# Patient Record
Sex: FEMALE | Race: WHITE | Employment: FULL TIME | URBAN - METROPOLITAN AREA
[De-identification: names, ages, dates, MRNs, and addresses within clinical notes are randomized per-mention and may not be internally consistent; named-entity substitution may affect disease eponyms.]

---

## 2017-04-10 ENCOUNTER — OFFICE VISIT (OUTPATIENT)
Dept: INTERNAL MEDICINE CLINIC | Facility: CLINIC | Age: 22
End: 2017-04-10

## 2017-04-10 VITALS
HEART RATE: 112 BPM | DIASTOLIC BLOOD PRESSURE: 80 MMHG | OXYGEN SATURATION: 97 % | SYSTOLIC BLOOD PRESSURE: 104 MMHG | BODY MASS INDEX: 43.15 KG/M2 | HEIGHT: 65 IN | TEMPERATURE: 99.1 F | RESPIRATION RATE: 18 BRPM | WEIGHT: 259 LBS

## 2017-04-10 DIAGNOSIS — E28.2 PCOS (POLYCYSTIC OVARIAN SYNDROME): Primary | ICD-10-CM

## 2017-04-10 PROBLEM — G93.2 PSEUDOTUMOR CEREBRI: Status: ACTIVE | Noted: 2017-04-10

## 2017-04-10 PROBLEM — E66.01 MORBID OBESITY (HCC): Status: ACTIVE | Noted: 2017-04-10

## 2017-04-10 PROBLEM — R01.1 HEART MURMUR: Status: ACTIVE | Noted: 2017-04-10

## 2017-04-10 LAB — HBA1C MFR BLD HPLC: 5.2 %

## 2017-04-10 RX ORDER — CLONAZEPAM 0.5 MG/1
TABLET ORAL
COMMUNITY
End: 2022-05-09

## 2017-04-10 RX ORDER — LORATADINE 10 MG/1
10 TABLET ORAL
COMMUNITY
End: 2022-05-09

## 2017-04-10 RX ORDER — ETONOGESTREL AND ETHINYL ESTRADIOL 11.7; 2.7 MG/1; MG/1
INSERT, EXTENDED RELEASE VAGINAL
COMMUNITY
End: 2022-05-09

## 2017-04-10 RX ORDER — METFORMIN HYDROCHLORIDE 500 MG/1
500 TABLET ORAL 2 TIMES DAILY WITH MEALS
Qty: 60 TAB | Refills: 5 | Status: SHIPPED | OUTPATIENT
Start: 2017-04-10 | End: 2022-05-09

## 2017-04-10 RX ORDER — ESCITALOPRAM OXALATE 10 MG/1
10 TABLET ORAL DAILY
COMMUNITY
End: 2022-05-09

## 2017-04-10 NOTE — PROGRESS NOTES
HISTORY OF PRESENT ILLNESS  Chad Wayne is a 23 y.o. female. Referred by Dr. Oamira Mcgill. Dx with Pseudotumor Cerebri. Was told to lose weight and follow up. Trouble losing weight. HPI  1) PCOS - dx 8/2016 after skipping periods x months. Pt was put on birth control (Cathlean Oar). As pt is not interested in maintaining her fertility and her acne is improved on birth control, she is happy with this. BS, Thyroid, and Testosterone were normal at GYN. Didn't have A1c done at GYN. Pt has done lots of reading and would like to try Metformin in addition to Cathlean Oar in the hopes that it might help her lose weight. Pt eats a plant based diet, avoiding most meat. Eats eggs regularly, but has not recently been focusing on protein. Plans to start doing so. Review of Systems   Constitutional: Negative for malaise/fatigue and weight loss. HENT: Positive for congestion. Endo/Heme/Allergies: Positive for environmental allergies. Psychiatric/Behavioral: Negative for depression. The patient is not nervous/anxious. Physical Exam   Constitutional: She is oriented to person, place, and time. She appears well-developed and well-nourished. No distress. HENT:   Head: Normocephalic and atraumatic. Neck: Neck supple. No JVD present. No thyromegaly present. Cardiovascular: Normal rate and regular rhythm. Murmur heard. Very soft heart murmur noted. Pulmonary/Chest: Effort normal and breath sounds normal. No respiratory distress. Musculoskeletal: She exhibits no edema. Neurological: She is alert and oriented to person, place, and time. Skin: Skin is warm and dry. Psychiatric: She has a normal mood and affect. Her behavior is normal. Judgment and thought content normal.   Nursing note and vitals reviewed. ASSESSMENT and PLAN    ICD-10-CM ICD-9-CM    1.  PCOS (polycystic ovarian syndrome) E28.2 256.4 AMB POC HEMOGLOBIN A1C normal.   Emphasized eating protein with each meal.       Start metFORMIN (GLUCOPHAGE) 500 mg tablet

## 2017-04-10 NOTE — MR AVS SNAPSHOT
Visit Information Date & Time Provider Department Dept. Phone Encounter #  
 4/10/2017  2:15 PM Jed Avila PA-C [facility] Internal Medicine [phone] [id number] Follow-up Instructions Return in about 6 months (around 10/10/2017) for Physical when convenient. Upcoming Health Maintenance Date Due Hepatitis A Peds Age 1-18 (1 of 2 - Standard Series) 3/25/1998 DTaP/Tdap/Td series (1 - Tdap) 3/25/2004 HPV AGE 9Y-26Y (1 of 3 - Female 3 Dose Series) 3/25/2008 INFLUENZA AGE 9 TO ADULT 8/1/2016 Allergies as of 4/10/2017  Review Complete On: 4/10/2017 By: Jed Avila PA-C No Known Allergies Current Immunizations  Never Reviewed No immunizations on file. Not reviewed this visit You Were Diagnosed With   
  
 Codes Comments PCOS (polycystic ovarian syndrome)    -  Primary ICD-10-CM: E28.2 ICD-9-CM: 256.4 Vitals BP Pulse Temp Resp Height(growth percentile) Weight(growth percentile) 147/87 (BP 1 Location: Left arm, BP Patient Position: Sitting) (!) 112 99.1 °F (37.3 °C) (Oral) 18 5' 5\" (1.651 m) 259 lb (117.5 kg) LMP SpO2 BMI OB Status Smoking Status 03/23/2017 97% 43.1 kg/m2 Having regular periods Former Smoker Vitals History BMI and BSA Data Body Mass Index Body Surface Area  
 43.1 kg/m 2 2.32 m 2 Preferred Pharmacy Pharmacy Name Phone [facility] [id number] [facility] [place] [phone] Your Updated Medication List  
  
   
This list is accurate as of: 4/10/17  3:14 PM.  Always use your most recent med list.  
  
  
  
  
 CLARITIN 10 mg tablet Generic drug:  loratadine Take 10 mg by mouth. KlonoPIN 0.5 mg tablet Generic drug:  clonazePAM  
Take  by mouth nightly as needed. LEXAPRO 10 mg tablet Generic drug:  escitalopram oxalate Take 10 mg by mouth daily. metFORMIN 500 mg tablet Commonly known as:  GLUCOPHAGE Take 1 Tab by mouth two (2) times daily (with meals). NUVARING 0.12-0.015 mg/24 hr vaginal ring Generic drug:  ethinyl estradiol-etonogestrel  
by Intravaginal route. Prescriptions Sent to Pharmacy Refills  
 metFORMIN (GLUCOPHAGE) 500 mg tablet 5 Sig: Take 1 Tab by mouth two (2) times daily (with meals). Class: Normal  
 Pharmacy: [facility] [id number] [facility] #: [phone] Route: Oral  
  
We Performed the Following AMB POC HEMOGLOBIN A1C [16604 CPT(R)] Follow-up Instructions Return in about 6 months (around 10/10/2017) for Physical when convenient. Introducing [facility]! [employer] introduces [employer] patient portal. Now you can access parts of your medical record, email your doctor's office, and request medication refills online. 1. In your internet browser, go to [email or web address] 2. Click on the First Time User? Click Here link in the Sign In box. You will see the New Member Sign Up page. 3. Enter your [email or web address] Access Code exactly as it appears below. You will not need to use this code after youve completed the sign-up process. If you do not sign up before the expiration date, you must request a new code. · [employer]ros Access Code: [facility] Expires: 7/9/2017  3:10 PM 
 
4. Enter the last four digits of your Social Security Number (xxxx) and Date of Birth (mm/dd/yyyy) as indicated and click Submit. You will be taken to the next sign-up page. 5. Create a [email or web address] ID. This will be your Tyros login ID and cannot be changed, so think of one that is secure and easy to remember. 6. Create a [employer]ros password. You can change your password at any time. 7. Enter your Password Reset Question and Answer. This can be used at a later time if you forget your password. 8. Enter your e-mail address. You will receive e-mail notification when new information is available in [address]. 9. Click Sign Up. You can now view and download portions of your medical record. 10. Click the Download Summary menu link to download a portable copy of your medical information. If you have questions, please visit the Frequently Asked Questions section of the [employer] website. Remember, [employer] is NOT to be used for urgent needs. For medical emergencies, dial 911. Now available from your iPhone and Android! Please provide this summary of care documentation to your next provider. If you have any questions after today's visit, please call [phone].

## 2017-04-10 NOTE — PROGRESS NOTES
Room 8    Chief Complaint   Patient presents with   [address] [facility] Patient     Establish Care. Patient would like to discuss starting metformin for PCOS. Newly diagnosed in 8/2016. Currently on Birth control     1. Have you been to the ER, urgent care clinic since your last visit? Hospitalized since your last visit? Yes Reason for visit: To [facility] for glass in foot a couple of weeks ago per patient    2. Have you seen or consulted any other health care providers outside of the [address] [place] [address] since your last visit? Include any pap smears or colon screening.  Yes Where: [facility]     Health Maintenance Due   Topic Date Due    Hepatitis A Peds Age 1-18 (1 of 2 - Standard Series) 03/25/1998    DTaP/Tdap/Td series (1 - Tdap) 03/25/2004    HPV AGE 9Y-26Y (1 of 3 - Female 3 Dose Series) 03/25/2008    INFLUENZA AGE 9 TO ADULT  08/01/2016

## 2017-04-29 ENCOUNTER — HOSPITAL ENCOUNTER (EMERGENCY)
Age: 22
Discharge: HOME | End: 2017-04-29
Attending: STUDENT IN AN ORGANIZED HEALTH CARE EDUCATION/TRAINING PROGRAM
Payer: COMMERCIAL

## 2017-04-29 ENCOUNTER — APPOINTMENT (OUTPATIENT)
Dept: CT IMAGING | Age: 22
End: 2017-04-29
Attending: STUDENT IN AN ORGANIZED HEALTH CARE EDUCATION/TRAINING PROGRAM
Payer: COMMERCIAL

## 2017-04-29 VITALS
WEIGHT: 263.45 LBS | BODY MASS INDEX: 43.84 KG/M2 | RESPIRATION RATE: 22 BRPM | HEART RATE: 115 BPM | SYSTOLIC BLOOD PRESSURE: 144 MMHG | OXYGEN SATURATION: 100 % | TEMPERATURE: 98.1 F | DIASTOLIC BLOOD PRESSURE: 92 MMHG

## 2017-04-29 DIAGNOSIS — G43.019 INTRACTABLE MIGRAINE WITHOUT AURA AND WITHOUT STATUS MIGRAINOSUS: Primary | ICD-10-CM

## 2017-04-29 LAB
BASOPHILS # BLD AUTO: 0 K/UL (ref 0–0.1)
BASOPHILS # BLD: 0 % (ref 0–1)
EOSINOPHIL # BLD: 0.4 K/UL (ref 0–0.4)
EOSINOPHIL NFR BLD: 3 % (ref 0–7)
ERYTHROCYTE [DISTWIDTH] IN BLOOD BY AUTOMATED COUNT: 13.1 % (ref 11.5–14.5)
HCG UR QL: NEGATIVE
HCT VFR BLD AUTO: 40.1 % (ref 35–47)
HGB BLD-MCNC: 13.1 G/DL (ref 11.5–16)
LYMPHOCYTES # BLD AUTO: 37 % (ref 12–49)
LYMPHOCYTES # BLD: 3.9 K/UL (ref 0.8–3.5)
MCH RBC QN AUTO: 26.8 PG (ref 26–34)
MCHC RBC AUTO-ENTMCNC: 32.7 G/DL (ref 30–36.5)
MCV RBC AUTO: 82 FL (ref 80–99)
MONOCYTES # BLD: 0.7 K/UL (ref 0–1)
MONOCYTES NFR BLD AUTO: 7 % (ref 5–13)
NEUTS SEG # BLD: 5.6 K/UL (ref 1.8–8)
NEUTS SEG NFR BLD AUTO: 53 % (ref 32–75)
PLATELET # BLD AUTO: 390 K/UL (ref 150–400)
RBC # BLD AUTO: 4.89 M/UL (ref 3.8–5.2)
WBC # BLD AUTO: 10.6 K/UL (ref 3.6–11)

## 2017-04-29 PROCEDURE — 96361 HYDRATE IV INFUSION ADD-ON: CPT

## 2017-04-29 PROCEDURE — 74011250636 HC RX REV CODE- 250/636: Performed by: STUDENT IN AN ORGANIZED HEALTH CARE EDUCATION/TRAINING PROGRAM

## 2017-04-29 PROCEDURE — 99283 EMERGENCY DEPT VISIT LOW MDM: CPT

## 2017-04-29 PROCEDURE — 81025 URINE PREGNANCY TEST: CPT

## 2017-04-29 PROCEDURE — 96375 TX/PRO/DX INJ NEW DRUG ADDON: CPT

## 2017-04-29 PROCEDURE — 96374 THER/PROPH/DIAG INJ IV PUSH: CPT

## 2017-04-29 PROCEDURE — 70450 CT HEAD/BRAIN W/O DYE: CPT

## 2017-04-29 PROCEDURE — 85025 COMPLETE CBC W/AUTO DIFF WBC: CPT | Performed by: STUDENT IN AN ORGANIZED HEALTH CARE EDUCATION/TRAINING PROGRAM

## 2017-04-29 RX ORDER — KETOROLAC TROMETHAMINE 30 MG/ML
30 INJECTION, SOLUTION INTRAMUSCULAR; INTRAVENOUS
Status: COMPLETED | OUTPATIENT
Start: 2017-04-29 | End: 2017-04-29

## 2017-04-29 RX ORDER — METOCLOPRAMIDE HYDROCHLORIDE 5 MG/ML
10 INJECTION INTRAMUSCULAR; INTRAVENOUS ONCE
Status: COMPLETED | OUTPATIENT
Start: 2017-04-29 | End: 2017-04-29

## 2017-04-29 RX ORDER — DIPHENHYDRAMINE HYDROCHLORIDE 50 MG/ML
25 INJECTION, SOLUTION INTRAMUSCULAR; INTRAVENOUS ONCE
Status: COMPLETED | OUTPATIENT
Start: 2017-04-29 | End: 2017-04-29

## 2017-04-29 RX ADMIN — KETOROLAC TROMETHAMINE 30 MG: 30 INJECTION, SOLUTION INTRAMUSCULAR at 19:07

## 2017-04-29 RX ADMIN — DIPHENHYDRAMINE HYDROCHLORIDE 25 MG: 50 INJECTION, SOLUTION INTRAMUSCULAR; INTRAVENOUS at 19:06

## 2017-04-29 RX ADMIN — SODIUM CHLORIDE 1000 ML: 900 INJECTION, SOLUTION INTRAVENOUS at 19:07

## 2017-04-29 RX ADMIN — METOCLOPRAMIDE 10 MG: 5 INJECTION, SOLUTION INTRAMUSCULAR; INTRAVENOUS at 19:08

## 2017-04-29 NOTE — DISCHARGE INSTRUCTIONS
Migraine Headache: Care Instructions  Your Care Instructions  Migraines are painful, throbbing headaches that often start on one side of the head. They may cause nausea and vomiting and make you sensitive to light, sound, or smell. Without treatment, migraines can last from 4 hours to a few days. Medicines can help prevent migraines or stop them after they have started. Your doctor can help you find which ones work best for you. Follow-up care is a key part of your treatment and safety. Be sure to make and go to all appointments, and call your doctor if you are having problems. It's also a good idea to know your test results and keep a list of the medicines you take. How can you care for yourself at home? · Do not drive if you have taken a prescription pain medicine. · Rest in a quiet, dark room until your headache is gone. Close your eyes, and try to relax or go to sleep. Don't watch TV or read. · Put a cold, moist cloth or cold pack on the painful area for 10 to 20 minutes at a time. Put a thin cloth between the cold pack and your skin. · Use a warm, moist towel or a heating pad set on low to relax tight shoulder and neck muscles. · Have someone gently massage your neck and shoulders. · Take your medicines exactly as prescribed. Call your doctor if you think you are having a problem with your medicine. You will get more details on the specific medicines your doctor prescribes. · Be careful not to take pain medicine more often than the instructions allow. You could get worse or more frequent headaches when the medicine wears off. To prevent migraines  · Keep a headache diary so you can figure out what triggers your headaches. Avoiding triggers may help you prevent headaches. Record when each headache began, how long it lasted, and what the pain was like.  (Was it throbbing, aching, stabbing, or dull?) Write down any other symptoms you had with the headache, such as nausea, flashing lights or dark spots, or sensitivity to bright light or loud noise. Note if the headache occurred near your period. List anything that might have triggered the headache. Triggers may include certain foods (chocolate, cheese, wine) or odors, smoke, bright light, stress, or lack of sleep. · If your doctor has prescribed medicine for your migraines, take it as directed. You may have medicine that you take only when you get a migraine and medicine that you take all the time to help prevent migraines. ¨ If your doctor has prescribed medicine for when you get a headache, take it at the first sign of a migraine, unless your doctor has given you other instructions. ¨ If your doctor has prescribed medicine to prevent migraines, take it exactly as prescribed. Call your doctor if you think you are having a problem with your medicine. · Find healthy ways to deal with stress. Migraines are most common during or right after stressful times. Take time to relax before and after you do something that has caused a migraine in the past.  · Try to keep your muscles relaxed by keeping good posture. Check your jaw, face, neck, and shoulder muscles for tension. Try to relax them. When you sit at a desk, change positions often. And make sure to stretch for 30 seconds each hour. · Get plenty of sleep and exercise. · Eat meals on a regular schedule. Avoid foods and drinks that often trigger migraines. These include chocolate, alcohol (especially red wine and port), aspartame, monosodium glutamate (MSG), and some additives found in foods (such as hot dogs, blake, cold cuts, aged cheeses, and pickled foods). · Limit caffeine. Don't drink too much coffee, tea, or soda. But don't quit caffeine suddenly. That can also give you migraines. · Do not smoke or allow others to smoke around you. If you need help quitting, talk to your doctor about stop-smoking programs and medicines. These can increase your chances of quitting for good.   · If you are taking birth control pills or hormone therapy, talk to your doctor about whether they are triggering your migraines. When should you call for help? Call 911 anytime you think you may need emergency care. For example, call if:  · You have signs of a stroke. These may include:  ¨ Sudden numbness, paralysis, or weakness in your face, arm, or leg, especially on only one side of your body. ¨ Sudden vision changes. ¨ Sudden trouble speaking. ¨ Sudden confusion or trouble understanding simple statements. ¨ Sudden problems with walking or balance. ¨ A sudden, severe headache that is different from past headaches. Call your doctor now or seek immediate medical care if:  · You have new or worse nausea and vomiting. · You have a new or higher fever. · Your headache gets much worse. Watch closely for changes in your health, and be sure to contact your doctor if:  · You are not getting better after 2 days (48 hours). Where can you learn more? Go to http://hemant-camacho.info/. Enter B225 in the search box to learn more about \"Migraine Headache: Care Instructions. \"  Current as of: October 14, 2016  Content Version: 11.2  © 3609-1126 [employer]. Care instructions adapted under license by [employer] (which disclaims liability or warranty for this information). If you have questions about a medical condition or this instruction, always ask your healthcare professional. [address] any warranty or liability for your use of this information.

## 2017-04-29 NOTE — ED NOTES
Rn went in room pt crying and stating she is very anxious and wants to go home. RN explained that the medication can add to this feeling. Tried relaxation techniques and pt refused stating she wants to go home.  MD holt

## 2017-04-29 NOTE — ED NOTES
EDUCATION: Patient education given on tylenol/motrin and the patient expresses understanding and acceptance of instructions.  Neli Alonzo RN 4/29/2017 7:47 PM

## 2017-04-29 NOTE — ED PROVIDER NOTES
HPI Comments: 22 yo F with history of anxiety and PCOS presenting for evaluation of headache. Onset of symptoms was 20 minutes prior to arrival.  Patient reports that this is the headache is bifrontal in natures and is associated with vision changes. Patient reports \"difficulty focusing\" and increased floaters in her eyes. No nausea or vomiting. No neck pain or neck stiffness. No fevers. No difficulty ambulating. No smoking or recent travel. The patient is on the [address]. Denies AM headaches. History of mild headaches. Patient is a 23 y.o. female presenting with headaches. The history is provided by the patient. Headache    Pertinent negatives include no fever, no shortness of breath, no dizziness, no nausea and no vomiting. 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         Family History:   Problem Relation Age of Onset    Other Mother      pcos    Hypertension Father     Diabetes Maternal Grandfather        Social History     Social History    Marital status: SINGLE     Spouse name: N/A    Number of children: 0    Years of education: N/A     Occupational History    Student - psychology and gender studies      [employer]    Part Time Job       Social History Main Topics    Smoking status: Former Smoker     Packs/day: 0.05     Years: 2.00     Types: Cigarettes    Smokeless tobacco: Never Used    Alcohol use Yes      Comment: 2x/month 2-4 drinks    Drug use: Yes     Special: Marijuana      Comment: 1-3x/week     Sexual activity: Not Currently     Other Topics Concern    Not on file     Social History Narrative    Lives with 2 roommates    2 cats         ALLERGIES: Review of patient's allergies indicates no known allergies.     Review of Systems   Constitutional: Negative for activity change, appetite change, chills, fatigue and fever. HENT: Negative for congestion, ear discharge, ear pain, hearing loss and rhinorrhea. Eyes: Negative for photophobia, redness and visual disturbance. Respiratory: Negative for cough, chest tightness, shortness of breath, wheezing and stridor. Cardiovascular: Negative for chest pain. Gastrointestinal: Negative for abdominal pain, constipation, diarrhea, nausea and vomiting. Genitourinary: Negative for decreased urine volume and dysuria. Musculoskeletal: Negative for back pain, neck pain and neck stiffness. Neurological: Positive for headaches. Negative for dizziness, seizures, syncope, light-headedness and numbness. Hematological: Does not bruise/bleed easily. All other systems reviewed and are negative. Vitals:    04/29/17 1707   BP: (!) 144/92   Pulse: (!) 109   Resp: 20   Temp: 98 °F (36.7 °C)   SpO2: 99%   Weight: 119.5 kg (263 lb 7.2 oz)            Physical Exam   Constitutional: She is oriented to person, place, and time. She appears well-developed and well-nourished. No distress. HENT:   Head: Normocephalic and atraumatic. Right Ear: External ear normal. Tympanic membrane is not erythematous. Left Ear: External ear normal. Tympanic membrane is not erythematous. Nose: Nose normal.   Mouth/Throat: Oropharynx is clear and moist. No oropharyngeal exudate. Eyes: Conjunctivae and EOM are normal. Right eye exhibits no discharge. Left eye exhibits no discharge. Fundoscopic exam:       The right eye shows no hemorrhage and no papilledema. The left eye shows no hemorrhage and no papilledema. Neck: Normal range of motion. Neck supple. Cardiovascular: Normal rate, regular rhythm, normal heart sounds and intact distal pulses. Exam reveals no gallop and no friction rub. No murmur heard. Pulmonary/Chest: Effort normal and breath sounds normal. No respiratory distress. She has no wheezes. She has no rales.  She exhibits no tenderness. Abdominal: Soft. Bowel sounds are normal. She exhibits no distension and no mass. There is no tenderness. There is no rebound and no guarding. Musculoskeletal: Normal range of motion. She exhibits no edema. Lymphadenopathy:     She has no cervical adenopathy. Neurological: She is alert and oriented to person, place, and time. She has normal strength. No cranial nerve deficit or sensory deficit. She exhibits normal muscle tone. She displays no seizure activity. Coordination and gait normal. GCS eye subscore is 4. GCS verbal subscore is 5. GCS motor subscore is 6. Reflex Scores:       Bicep reflexes are 2+ on the right side and 2+ on the left side. Patellar reflexes are 2+ on the right side and 2+ on the left side. Skin: Skin is warm and dry. No rash noted. She is not diaphoretic. No erythema. No pallor. Psychiatric: She has a normal mood and affect. Her behavior is normal. Thought content normal.   Nursing note and vitals reviewed. MDM  Number of Diagnoses or Management Options  Intractable migraine without aura and without status migrainosus:   Diagnosis management comments: 24 yo F with acute onset of headache. No fevers, vomiting or neck stiffness to suggest meningitis. No double vision, ataxia, AM headaches or history of recurrent HA to suggest pseudotumor (and discs are sharp) or intracranial mass. Given the severity of the HA (though patient with history of anxiety and notably anxious on exam) will obtain CT prior to migraine treatment. CT of the head was within normal limits. Will treat with toradol, reglan (will avoid compazine due to patient's history of anxiety), benadryl and fluids. Patient became increasingly anxious prior to the administration of medications. Asking frequently about the interactions with her clonidine. After medications and 500 cc of NS the patient reports feeling better (headache is gone) and is requesting discharge.   HR mildly elevated - recommended that the patient stay to finish fluids and to be reassessed but the patient refused.        Amount and/or Complexity of Data Reviewed  Clinical lab tests: ordered and reviewed  Tests in the radiology section of CPT®: ordered and reviewed  Tests in the medicine section of CPT®: ordered and reviewed  Review and summarize past medical records: yes  Independent visualization of images, tracings, or specimens: yes    Risk of Complications, Morbidity, and/or Mortality  Presenting problems: moderate  Diagnostic procedures: moderate  Management options: moderate    Patient Progress  Patient progress: improved    ED Course       Procedures

## 2017-04-29 NOTE — ED TRIAGE NOTES
Triage note: Patient stating that approx 20 minutes ago started with \"terrible headache, and my vision, i can't explain it but it doesn't go black but its not right and there are floaters and stuff. \" Ambulated from waiting room to patient room without difficulty or assistance.

## 2017-04-29 NOTE — ED NOTES
Discharge instructions gone over with pt who verbalized understanding. Pt discharged. Pt had a ride to go home.

## 2017-08-22 ENCOUNTER — OFFICE VISIT (OUTPATIENT)
Dept: NEUROLOGY | Age: 22
End: 2017-08-22

## 2017-08-22 VITALS
DIASTOLIC BLOOD PRESSURE: 72 MMHG | HEART RATE: 96 BPM | WEIGHT: 259 LBS | OXYGEN SATURATION: 98 % | BODY MASS INDEX: 43.15 KG/M2 | SYSTOLIC BLOOD PRESSURE: 110 MMHG | HEIGHT: 65 IN

## 2017-08-22 DIAGNOSIS — H53.8 BLURRED VISION: ICD-10-CM

## 2017-08-22 DIAGNOSIS — H47.10 OPTIC NERVE SWELLING: ICD-10-CM

## 2017-08-22 DIAGNOSIS — G93.2 PSEUDOTUMOR CEREBRI: Primary | ICD-10-CM

## 2017-08-22 DIAGNOSIS — E28.2 PCOS (POLYCYSTIC OVARIAN SYNDROME): ICD-10-CM

## 2017-08-22 RX ORDER — ACETAZOLAMIDE 500 MG/1
500 CAPSULE, EXTENDED RELEASE ORAL 2 TIMES DAILY
Qty: 60 CAP | Refills: 3 | Status: SHIPPED | OUTPATIENT
Start: 2017-08-22 | End: 2022-05-09

## 2017-08-22 NOTE — PROGRESS NOTES
NEUROLOGY HISTORY AND PHYSICAL    Name Madi Melchor Age 23 y.o. MRN [id number]  1995     Referring Blayne Gonzalez PA-C      Chief Complaint:  headaches     This is a 23 y.o. with a history of headaches and starting 2 years ago. She was seen by an eye doctor who diagnosed her with pseudotumor cerebri. She was referred here. She has halos at night, neck stiffness, she has tinnitus she has no visual loss. Her headaches are worse when laying down. Assessment and Plan   1. Pseudotumor cerebri  \ MRI BRAIN WO CONT; Future  - XR SPINAL PUNC LUMB DX; Future  - acetaZOLAMIDE SR (DIAMOX) 500 mg capsule; Take 1 Cap by mouth two (2) times a day. Dispense: 60 Cap; Refill: 3    Medication, rationale, route of administration, adverse reactions and alternatives were discussed in detail and all questions were answered. Patient educated on condition and course of treatment    2. PCOS (polycystic ovarian syndrome)  To start metformin     3. Blurred vision      4. Optic nerve swelling  Visual fields with ophthalmologist    60  minutes spent more than 50% spent in face to face  examination and discussion of treatment options and approach      Patient Allergies    Review of patient's allergies indicates no known allergies. Current Outpatient Prescriptions   Medication Sig    ethinyl estradiol-etonogestrel (NUVARING) 0.12-0.015 mg/24 hr vaginal ring by Intravaginal route.  escitalopram oxalate (LEXAPRO) 10 mg tablet Take 10 mg by mouth daily.  loratadine (CLARITIN) 10 mg tablet Take 10 mg by mouth.  clonazePAM (KLONOPIN) 0.5 mg tablet Take  by mouth nightly as needed.  metFORMIN (GLUCOPHAGE) 500 mg tablet Take 1 Tab by mouth two (2) times daily (with meals).  hydrOXYzine (ATARAX) 50 mg tablet Take 50 mg by mouth three (3) times daily as needed for Itching.  alprazolam (XANAX) 0.25 mg tablet Take 1 tablet by mouth every eight (8) hours as needed for Anxiety.     loratadine (CLARITIN) 10 mg tablet Take 10 mg by mouth daily. No current facility-administered medications for this visit. Past Medical History:   Diagnosis Date    Allergic rhinitis     Anxiety     PCOS (polycystic ovarian syndrome)        Social History   Substance Use Topics    Smoking status: Former Smoker     Packs/day: 0.05     Years: 2.00     Types: Cigarettes    Smokeless tobacco: Never Used    Alcohol use Yes      Comment: 2x/month 2-4 drinks       Family History   Problem Relation Age of Onset    Other Mother      pcos    Hypertension Father     Diabetes Maternal Grandfather      Review of Systems   Constitutional: Negative for chills and fever. HENT: Positive for tinnitus. Negative for ear pain. Eyes: Negative for pain and discharge. Respiratory: Negative for cough and hemoptysis. Cardiovascular: Negative for chest pain and claudication. Gastrointestinal: Negative for constipation and diarrhea. Genitourinary: Negative for flank pain and hematuria. Musculoskeletal: Positive for myalgias. Negative for back pain. Skin: Negative for itching and rash. Neurological: Negative for headaches. Endo/Heme/Allergies: Negative for environmental allergies. Does not bruise/bleed easily. Psychiatric/Behavioral: Negative for depression and hallucinations. Visit Vitals    /72    Pulse 96    Ht 5' 5\" (1.651 m)    Wt 259 lb (117.5 kg)    SpO2 98%    BMI 43.1 kg/m2         General: Well developed, well nourished. Patient in no apparent distress   Head: Normocephalic, atraumatic, anicteric sclera  Eyes papilleda worse on the left   Neck Normal ROM, No thyromegally   Lungs:  Clear to auscultation bilaterally, No wheezes or rubs   Cardiac: Regular rate and rhythm with no murmurs. Abd: Bowel sounds were audible.  No tenderness on palpation   Ext: No pedal edema   Skin: No overt signs of rash or insect bites     NeurologicExam:  Mental Status: Alert and oriented to person place and time Speech: Fluent no aphasia or dysarthria. Cranial Nerves:   II Intact visual fields. III, IV VI Extra ocular movements intact  V Facial sensation is normal.   VII Facial movement is symmetric. VIII Hearing intact no nystagmus    IX, X Normal gag, symmetric movement of palate and uvula  XI Symmetric shoulder shrug and head turn   XII Tongue midline with no atrophy   Motor:  Full and symmetric strength of upper and lower proximal and distal muscles. Normal bulk and tone. Reflexes:   Deep tendon reflexes 2+/4 and symmetric. Sensory:   Symmetric and intact with no perceived deficits modalities involving small or large fibers. Gait:  Gait is balanced and fluid with normal arm swing. Tremor:   No tremor noted. Cerebellar:  Coordination intact. Neurovascular: No carotid bruits. No JVD         Imaging      Results from Hospital Encounter encounter on 04/29/17   CT HEAD WO CONT  EXAM:  CT HEAD WO CONT    INDICATION:   headache, acute onset of severe headache approximately 20 minutes  prior to admission    COMPARISON: None. TECHNIQUE: Unenhanced CT of the head was performed using 5 mm images. Brain and  bone windows were generated. CT dose reduction was achieved through use of a  standardized protocol tailored for this examination and automatic exposure  control for dose modulation. FINDINGS:  The ventricles and sulci are normal in size, shape and configuration and  midline. There is no significant white matter disease. There is no intracranial  hemorrhage, extra-axial collection, mass, mass effect or midline shift. The  basilar cisterns are open. No acute infarct is identified. The bone windows  demonstrate no abnormalities. The visualized portions of the paranasal sinuses  and mastoid air cells are clear. IMPRESSION: No abnormalities demonstrated.             Lab Review  Lab Results   Component Value Date/Time    WBC 10.6 04/29/2017 06:06 PM    HCT 40.1 04/29/2017 06:06 PM    HGB 13.1 04/29/2017 06:06 PM    PLATELET 721 77/98/8276 06:06 PM     Lab Results   Component Value Date/Time    Sodium 137 10/13/2014 10:00 PM    Potassium 4.5 10/13/2014 10:00 PM    Chloride 106 10/13/2014 10:00 PM    CO2 25 10/13/2014 10:00 PM    Glucose 91 10/13/2014 10:00 PM    BUN 10 10/13/2014 10:00 PM    Creatinine 0.64 10/13/2014 10:00 PM    Calcium 9.7 10/13/2014 10:00 PM     No results found for: B12LT, FOL, RBCF  No results found for: LDL, LDLC, DLDLP  Lab Results   Component Value Date/Time    Hemoglobin A1c (POC) 5.2 04/10/2017 03:11 PM

## 2017-08-22 NOTE — PATIENT INSTRUCTIONS
[facility]   Statement to Patients  April 1, 2014      In an effort to ensure the large volume of patient prescription refills is processed in the most efficient and expeditious manner, we are asking our patients to assist us by calling your Pharmacy for all prescription refills, this will include also your  Mail Order Pharmacy. The pharmacy will contact our office electronically to continue the refill process. Please do not wait until the last minute to call your pharmacy. We need at least 48 hours (2days) to fill prescriptions. We also encourage you to call your pharmacy before going to  your prescription to make sure it is ready. With regard to controlled substance prescription refill requests (narcotic refills) that need to be picked up at our office, we ask your cooperation by providing us with at least 72 hours (3days) notice that you will need a refill. We will not refill narcotic prescription refill requests after 4:00pm on any weekday, Monday through Thursday, or after 2:00pm on Fridays, or on the weekends. We encourage everyone to explore another way of getting your prescription refill request processed using [employer] our patient web portal through our electronic medical record system. [employer] is an efficient and effective way to communicate your medication request directly to the office and  downloadable as an jordan on your smart phone . [employer] also features a review functionality that allows you to view your medication list as well as leave messages for your physician. Are you ready to get connected? If so please review the attatched instructions or speak to any of our staff to get you set up right away! Thank you so much for your cooperation. Should you have any questions please contact our Practice Administrator.     The Physicians and Staff,  [facility]          A Healthy Lifestyle: Care Instructions  Your Care Instructions  A healthy lifestyle can help you feel good, stay at a healthy weight, and have plenty of energy for both work and play. A healthy lifestyle is something you can share with your whole family. A healthy lifestyle also can lower your risk for serious health problems, such as high blood pressure, heart disease, and diabetes. You can follow a few steps listed below to improve your health and the health of your family. Follow-up care is a key part of your treatment and safety. Be sure to make and go to all appointments, and call your doctor if you are having problems. Its also a good idea to know your test results and keep a list of the medicines you take. How can you care for yourself at home? · Do not eat too much sugar, fat, or fast foods. You can still have dessert and treats now and then. The goal is moderation. · Start small to improve your eating habits. Pay attention to portion sizes, drink less juice and soda pop, and eat more fruits and vegetables. ¨ Eat a healthy amount of food. A 3-ounce serving of meat, for example, is about the size of a deck of cards. Fill the rest of your plate with vegetables and whole grains. ¨ Limit the amount of soda and sports drinks you have every day. Drink more water when you are thirsty. ¨ Eat at least 5 servings of fruits and vegetables every day. It may seem like a lot, but it is not hard to reach this goal. A serving or helping is 1 piece of fruit, 1 cup of vegetables, or 2 cups of leafy, raw vegetables. Have an apple or some carrot sticks as an afternoon snack instead of a candy bar. Try to have fruits and/or vegetables at every meal.  · Make exercise part of your daily routine. You may want to start with simple activities, such as walking, bicycling, or slow swimming. Try to be active 30 to 60 minutes every day. You do not need to do all 30 to 60 minutes all at once. For example, you can exercise 3 times a day for 10 or 20 minutes.  Moderate exercise is safe for most people, but it is always a good idea to talk to your doctor before starting an exercise program.  · Keep moving. [employer] [employer] the lawn, work in the garden, or [employer]. Take the stairs instead of the elevator at work. · If you smoke, quit. People who smoke have an increased risk for heart attack, stroke, cancer, and other lung illnesses. Quitting is hard, but there are ways to boost your chance of quitting tobacco for good. ¨ Use nicotine gum, patches, or lozenges. ¨ Ask your doctor about stop-smoking programs and medicines. ¨ Keep trying. In addition to reducing your risk of diseases in the future, you will notice some benefits soon after you stop using tobacco. If you have shortness of breath or asthma symptoms, they will likely get better within a few weeks after you quit. · Limit how much alcohol you drink. Moderate amounts of alcohol (up to 2 drinks a day for men, 1 drink a day for women) are okay. But drinking too much can lead to liver problems, high blood pressure, and other health problems. Family health  If you have a family, there are many things you can do together to improve your health. · Eat meals together as a family as often as possible. · Eat healthy foods. This includes fruits, vegetables, lean meats and dairy, and whole grains. · Include your family in your fitness plan. Most people think of activities such as jogging or tennis as the way to fitness, but there are many ways you and your family can be more active. Anything that makes you breathe hard and gets your heart pumping is exercise. Here are some tips:  ¨ Walk to do errands or to take your child to school or the bus. ¨ Go for a family bike ride after dinner instead of watching TV. Where can you learn more? Go to http://hemant-camacho.info/. Enter O444 in the search box to learn more about \"A Healthy Lifestyle: Care Instructions. \"  Current as of: July 26, 2016  Content Version: 11.3  © 3952-9006 [employer]. Care instructions adapted under license by [employer] (which disclaims liability or warranty for this information). If you have questions about a medical condition or this instruction, always ask your healthcare professional. [address] any warranty or liability for your use of this information.

## 2017-08-31 ENCOUNTER — TELEPHONE (OUTPATIENT)
Dept: NEUROLOGY | Age: 22
End: 2017-08-31

## 2017-08-31 NOTE — TELEPHONE ENCOUNTER
Received a call from [facility] from [facility] eye [facility] and they need to know what type of visual field test she needs and also the diagnosis     Once received will call back [phone]

## 2017-09-01 ENCOUNTER — TELEPHONE (OUTPATIENT)
Dept: NEUROLOGY | Age: 22
End: 2017-09-01

## 2017-09-01 NOTE — TELEPHONE ENCOUNTER
Received call from [facility], they stated that if a doctor orders the MRI with contrast the insurance will approve it. Patient is scheduled to have the MRI tomorrow. ----- Message from Cameorn Busby sent at 9/1/2017 12:57 PM EDT -----  Regarding: Dr. Svitlana Grant) stated MRI of the candice without contrast was denied, but was unapproved and would like to know if the doctor would like it approved with and without contrast.  Best contact number [phone] case#[id number].

## 2017-09-05 NOTE — TELEPHONE ENCOUNTER
Janet Escobedo sent to [facility] like they will approve either of these ways.    MRI of the brain   with contrast (CPT 30269) or without and with contrast (CPT 82240) can be   approved as an alternative       This came in today.  Sent another message to find out what is going on with this patient

## 2017-09-06 DIAGNOSIS — G93.2 PSEUDOTUMOR CEREBRI: Primary | ICD-10-CM

## 2017-09-06 NOTE — TELEPHONE ENCOUNTER
Message -----     From: Aleshia Bridges     Sent: 9/5/2017   5:16 PM       To: Kehinde Harp    It was denied for without contrast only, they will approve it if it is either with contrast only or with and without contrast. Let me know when a new order is in and I will grab it.  Thanks

## 2017-09-07 NOTE — TELEPHONE ENCOUNTER
Called [facility] eye [facility] and spoke with [facility] and provided the type of testing that is needed as well as the dignosis

## 2017-09-11 ENCOUNTER — TELEPHONE (OUTPATIENT)
Dept: NEUROLOGY | Age: 22
End: 2017-09-11

## 2017-09-13 DIAGNOSIS — F40.240 CLAUSTROPHOBIA: Primary | ICD-10-CM

## 2017-09-13 RX ORDER — LORAZEPAM 1 MG/1
2 TABLET ORAL ONCE
Qty: 2 TAB | Refills: 0 | Status: SHIPPED | OUTPATIENT
Start: 2017-09-13 | End: 2017-09-13

## 2017-09-15 ENCOUNTER — HOSPITAL ENCOUNTER (OUTPATIENT)
Dept: MRI IMAGING | Age: 22
Discharge: HOME | End: 2017-09-15
Attending: PSYCHIATRY & NEUROLOGY
Payer: COMMERCIAL

## 2017-09-15 DIAGNOSIS — G93.2 PSEUDOTUMOR CEREBRI: ICD-10-CM

## 2017-09-15 PROCEDURE — 70553 MRI BRAIN STEM W/O & W/DYE: CPT

## 2017-09-15 PROCEDURE — 70551 MRI BRAIN STEM W/O DYE: CPT

## 2017-09-15 PROCEDURE — A9577 INJ MULTIHANCE: HCPCS | Performed by: PSYCHIATRY & NEUROLOGY

## 2017-09-15 PROCEDURE — 74011250636 HC RX REV CODE- 250/636: Performed by: PSYCHIATRY & NEUROLOGY

## 2017-09-15 RX ADMIN — GADOBENATE DIMEGLUMINE 20 ML: 529 INJECTION, SOLUTION INTRAVENOUS at 17:56

## 2017-09-18 ENCOUNTER — TELEPHONE (OUTPATIENT)
Dept: NEUROLOGY | Age: 22
End: 2017-09-18

## 2018-07-30 ENCOUNTER — OFFICE VISIT (OUTPATIENT)
Dept: NEUROLOGY | Age: 23
End: 2018-07-30

## 2018-07-30 VITALS
BODY MASS INDEX: 43.15 KG/M2 | HEIGHT: 65 IN | SYSTOLIC BLOOD PRESSURE: 122 MMHG | WEIGHT: 259 LBS | OXYGEN SATURATION: 98 % | DIASTOLIC BLOOD PRESSURE: 80 MMHG | HEART RATE: 94 BPM

## 2018-07-30 DIAGNOSIS — H93.13 TINNITUS OF BOTH EARS: Primary | ICD-10-CM

## 2018-07-30 DIAGNOSIS — G93.2 PSEUDOTUMOR CEREBRI: ICD-10-CM

## 2018-07-30 DIAGNOSIS — G47.33 OBSTRUCTIVE SLEEP APNEA SYNDROME: ICD-10-CM

## 2018-07-30 NOTE — PROGRESS NOTES
NEUROLOGY HISTORY AND PHYSICAL    Name Evelio Singleton Age 26 y.o. MRN [id number]  1995     Referring Kenyatta Montoya PA-C      Chief Complaint:  headaches     Returns for a follow up visit. Was evaluated by an ophthalmologist and a visual field was done which turned out were both normal. She continues to have blurred vision and occasional headaches and upset that she does not have an explanation for these. She did not get the spinal tap at the time of her last visit. She is scheduled to see an allergist-immunologist. We also discussed that she see an ENT for her tinnitus and a sleep specialist for possible sleep apnea contributing to her symptoms. Assessment and Plan   1. Pseudotumor cerebri  Resolved advised her to be vigilant should the symptoms return. Will reissue, at that point, the order for the spinal tap. 2. PCOS (polycystic ovarian syndrome)  on metformin     3. Blurred vision    4. Optic nerve swelling  Resolved    5. Tinnitus  stable    Patient Allergies    Review of patient's allergies indicates no known allergies. Current Outpatient Prescriptions   Medication Sig    acetaZOLAMIDE SR (DIAMOX) 500 mg capsule Take 1 Cap by mouth two (2) times a day.  ethinyl estradiol-etonogestrel (NUVARING) 0.12-0.015 mg/24 hr vaginal ring by Intravaginal route.  escitalopram oxalate (LEXAPRO) 10 mg tablet Take 10 mg by mouth daily.  loratadine (CLARITIN) 10 mg tablet Take 10 mg by mouth.  clonazePAM (KLONOPIN) 0.5 mg tablet Take  by mouth nightly as needed.  metFORMIN (GLUCOPHAGE) 500 mg tablet Take 1 Tab by mouth two (2) times daily (with meals).  hydrOXYzine (ATARAX) 50 mg tablet Take 50 mg by mouth three (3) times daily as needed for Itching.  alprazolam (XANAX) 0.25 mg tablet Take 1 tablet by mouth every eight (8) hours as needed for Anxiety.  loratadine (CLARITIN) 10 mg tablet Take 10 mg by mouth daily.      No current facility-administered medications for this visit. Past Medical History:   Diagnosis Date    Allergic rhinitis     Anxiety     PCOS (polycystic ovarian syndrome)        Social History   Substance Use Topics    Smoking status: Former Smoker     Packs/day: 0.05     Years: 2.00     Types: Cigarettes    Smokeless tobacco: Never Used    Alcohol use Yes      Comment: 2x/month 2-4 drinks       Family History   Problem Relation Age of Onset    Other Mother      pcos    Headache Mother     Hypertension Father     Diabetes Maternal Grandmother     Diabetes Maternal Grandfather     Headache Maternal Grandfather     Heart Disease Maternal Grandfather     Cancer Maternal Aunt     Heart Disease Paternal Grandmother     Dementia Other      Review of Systems   Constitutional: Negative for chills and fever. HENT: Positive for tinnitus. Negative for ear pain. Eyes: Negative for pain and discharge. Respiratory: Negative for cough and hemoptysis. Cardiovascular: Negative for chest pain and claudication. Gastrointestinal: Negative for constipation and diarrhea. Genitourinary: Negative for flank pain and hematuria. Musculoskeletal: Positive for myalgias. Negative for back pain. Skin: Negative for itching and rash. Neurological: Negative for headaches. Endo/Heme/Allergies: Negative for environmental allergies. Does not bruise/bleed easily. Psychiatric/Behavioral: Negative for depression and hallucinations. Visit Vitals    /80    Pulse 94    Ht 5' 5\" (1.651 m)    Wt 259 lb (117.5 kg)    SpO2 98%    BMI 43.1 kg/m2         General: Well developed, well nourished. Upset   Head: Normocephalic, atraumatic, anicteric sclera   Neck Normal ROM, No thyromegally   Ext: No pedal edema   Skin: Supple no rash     NeurologicExam:  Mental Status: Alert and oriented to person place and time   Speech: Fluent no aphasia or dysarthria. Cranial Nerves:  II - XII Intact  Funduscopic exam reveals sharp discs.     Motor:  Full and symmetric strength of upper and lower proximal and distal muscles. Normal bulk and tone. Sensory:   Symmetric and intact with no perceived deficits modalities involving small or large fibers. Gait:  Gait is balanced and fluid with normal arm swing. Tremor:   No tremor noted. Cerebellar:  Coordination intact. Imaging    Results from [place] encounter on 04/29/17   CT HEAD WO CONT  EXAM:  CT HEAD WO CONT    INDICATION:   headache, acute onset of severe headache approximately 20 minutes  prior to admission    COMPARISON: None. TECHNIQUE: Unenhanced CT of the head was performed using 5 mm images. Brain and  bone windows were generated. CT dose reduction was achieved through use of a  standardized protocol tailored for this examination and automatic exposure  control for dose modulation. FINDINGS:  The ventricles and sulci are normal in size, shape and configuration and  midline. There is no significant white matter disease. There is no intracranial  hemorrhage, extra-axial collection, mass, mass effect or midline shift. The  basilar cisterns are open. No acute infarct is identified. The bone windows  demonstrate no abnormalities. The visualized portions of the paranasal sinuses  and mastoid air cells are clear. IMPRESSION: No abnormalities demonstrated.             Lab Review  Lab Results   Component Value Date/Time    WBC 10.6 04/29/2017 06:06 PM    HCT 40.1 04/29/2017 06:06 PM    HGB 13.1 04/29/2017 06:06 PM    PLATELET 144 13/42/3400 06:06 PM     Lab Results   Component Value Date/Time    Sodium 137 10/13/2014 10:00 PM    Potassium 4.5 10/13/2014 10:00 PM    Chloride 106 10/13/2014 10:00 PM    CO2 25 10/13/2014 10:00 PM    Glucose 91 10/13/2014 10:00 PM    BUN 10 10/13/2014 10:00 PM    Creatinine 0.64 10/13/2014 10:00 PM    Calcium 9.7 10/13/2014 10:00 PM       Lab Results   Component Value Date/Time    Hemoglobin A1c (POC) 5.2 04/10/2017 03:11 PM

## 2018-07-30 NOTE — MR AVS SNAPSHOT
[address], 
[address], [address] [facility] 
[phone] Patient: Mara Lombard MRN: [id number] PONCE:7/43/2808 Visit Information Date & Time Provider Department Dept. Phone Encounter #  
 7/30/2018 11:00 AM Yan Herndon MD Neurology [facility] at [facility] [phone] [id number] Follow-up Instructions Return if symptoms worsen or fail to improve. Upcoming Health Maintenance Date Due  
 HPV Age 9Y-34Y (1 of 1 - Female 3 Dose Series) 3/25/2008 DTaP/Tdap/Td series (1 - Tdap) 3/25/2018 PAP AKA CERVICAL CYTOLOGY 3/25/2018 Influenza Age 5 to Adult 8/1/2018 Allergies as of 7/30/2018  Review Complete On: 7/30/2018 By: Yan Herndon MD  
 No Known Allergies Current Immunizations  Never Reviewed No immunizations on file. Not reviewed this visit You Were Diagnosed With   
  
 Codes Comments Tinnitus of both ears    -  Primary ICD-10-CM: H93.13 
ICD-9-CM: 388.30 Obstructive sleep apnea syndrome     ICD-10-CM: G47.33 
ICD-9-CM: 327.23 Pseudotumor cerebri     ICD-10-CM: G93.2 ICD-9-CM: 865. 2 Vitals BP Pulse Height(growth percentile) Weight(growth percentile) SpO2 BMI  
 122/80 94 5' 5\" (1.651 m) 259 lb (117.5 kg) 98% 43.1 kg/m2 OB Status Smoking Status Having regular periods Former Smoker BMI and BSA Data Body Mass Index Body Surface Area  
 43.1 kg/m 2 2.32 m 2 Preferred Pharmacy Pharmacy Name Phone NO PHARMACY PREFERENCE Your Updated Medication List  
  
   
This list is accurate as of 7/30/18 12:26 PM.  Always use your most recent med list.  
  
  
  
  
 acetaZOLAMIDE  mg capsule Commonly known as:  DIAMOX Take 1 Cap by mouth two (2) times a day. ALPRAZolam 0.25 mg tablet Commonly known as:  Jeri Hearing Take 1 tablet by mouth every eight (8) hours as needed for Anxiety. * CLARITIN 10 mg tablet Generic drug:  loratadine Take 10 mg by mouth. * CLARITIN 10 mg tablet Generic drug:  loratadine Take 10 mg by mouth daily. hydrOXYzine HCl 50 mg tablet Commonly known as:  ATARAX Take 50 mg by mouth three (3) times daily as needed for Itching. KlonoPIN 0.5 mg tablet Generic drug:  clonazePAM  
Take  by mouth nightly as needed. LEXAPRO 10 mg tablet Generic drug:  escitalopram oxalate Take 10 mg by mouth daily. metFORMIN 500 mg tablet Commonly known as:  GLUCOPHAGE Take 1 Tab by mouth two (2) times daily (with meals). NUVARING 0.12-0.015 mg/24 hr vaginal ring Generic drug:  ethinyl estradiol-etonogestrel  
by Intravaginal route. * Notice: This list has 2 medication(s) that are the same as other medications prescribed for you. Read the directions carefully, and ask your doctor or other care provider to review them with you. We Performed the Following REFERRAL TO ENT-OTOLARYNGOLOGY [CMJ70 Custom] SLEEP MEDICINE REFERRAL [FOI417 Custom] Follow-up Instructions Return if symptoms worsen or fail to improve. Referral Information Referral ID Referred By Referred To  
  
 [id number] Patricio Mosqueda MD   
   [address] [address] Phone: [phone] Fax: [phone] Visits Status Start Date End Date 1 New Request 7/30/18 7/30/19 If your referral has a status of pending review or denied, additional information will be sent to support the outcome of this decision. Referral ID Referred By Referred To  
 [id number] BINA, [address] [place] [address] Visits Status Start Date End Date 1 New Request 7/30/18 7/30/19 If your referral has a status of pending review or denied, additional information will be sent to support the outcome of this decision. Patient Instructions Office Policies Phone calls/patient messages: · Please allow up to 24 hours for someone in the office to contact you about your call or message. Be mindful your provider may be out of the office or your message may require further review. We encourage you to use Hookit for your messages as this is a faster, more efficient way to communicate with our office Medication Refills: · Prescription medications require up to 48 business hours to process. We encourage you to use Hookit for your refills. · For controlled medications: Please allow up to 72 business hours to process. Certain medications may require you to  a written prescription at our office. · NO narcotic/controlled medications will be prescribed after 4pm Monday through Friday or on weekends Form/Paperwork Completion: · Please note there is a $25 fee for all paperwork completed by our providers. We ask that you allow 7-14 business days. Pre-payment is due prior to picking up/faxing the completed form. You may also download your forms to Hookit to have your doctor print off. A Healthy Lifestyle: Care Instructions Your Care Instructions A healthy lifestyle can help you feel good, stay at a healthy weight, and have plenty of energy for both work and play. A healthy lifestyle is something you can share with your whole family. A healthy lifestyle also can lower your risk for serious health problems, such as high blood pressure, heart disease, and diabetes. You can follow a few steps listed below to improve your health and the health of your family. Follow-up care is a key part of your treatment and safety. Be sure to make and go to all appointments, and call your doctor if you are having problems. It's also a good idea to know your test results and keep a list of the medicines you take. How can you care for yourself at home? · Do not eat too much sugar, fat, or fast foods. You can still have dessert and treats now and then. The goal is moderation. · Start small to improve your eating habits. Pay attention to portion sizes, drink less juice and soda pop, and eat more fruits and vegetables. ¨ Eat a healthy amount of food. A 3-ounce serving of meat, for example, is about the size of a deck of cards. Fill the rest of your plate with vegetables and whole grains. ¨ Limit the amount of soda and sports drinks you have every day. Drink more water when you are thirsty. ¨ Eat at least 5 servings of fruits and vegetables every day. It may seem like a lot, but it is not hard to reach this goal. A serving or helping is 1 piece of fruit, 1 cup of vegetables, or 2 cups of leafy, raw vegetables. Have an apple or some carrot sticks as an afternoon snack instead of a candy bar. Try to have fruits and/or vegetables at every meal. 
· Make exercise part of your daily routine. You may want to start with simple activities, such as walking, bicycling, or slow swimming. Try to be active 30 to 60 minutes every day. You do not need to do all 30 to 60 minutes all at once. For example, you can exercise 3 times a day for 10 or 20 minutes. Moderate exercise is safe for most people, but it is always a good idea to talk to your doctor before starting an exercise program. 
· Keep moving. Chay Alcantar the lawn, work in the garden, or [employer]. Take the stairs instead of the elevator at work. · If you smoke, quit. People who smoke have an increased risk for heart attack, stroke, cancer, and other lung illnesses. Quitting is hard, but there are ways to boost your chance of quitting tobacco for good. ¨ Use nicotine gum, patches, or lozenges. ¨ Ask your doctor about stop-smoking programs and medicines. ¨ Keep trying.  
In addition to reducing your risk of diseases in the future, you will notice some benefits soon after you stop using tobacco. If you have shortness of breath or asthma symptoms, they will likely get better within a few weeks after you quit. · Limit how much alcohol you drink. Moderate amounts of alcohol (up to 2 drinks a day for men, 1 drink a day for women) are okay. But drinking too much can lead to liver problems, high blood pressure, and other health problems. Family health If you have a family, there are many things you can do together to improve your health. · Eat meals together as a family as often as possible. · Eat healthy foods. This includes fruits, vegetables, lean meats and dairy, and whole grains. · Include your family in your fitness plan. Most people think of activities such as jogging or tennis as the way to fitness, but there are many ways you and your family can be more active. Anything that makes you breathe hard and gets your heart pumping is exercise. Here are some tips: 
¨ Walk to do errands or to take your child to school or the bus. ¨ Go for a family bike ride after dinner instead of watching TV. Where can you learn more? Go to http://hemant-camacho.info/. Enter Z959 in the search box to learn more about \"A Healthy Lifestyle: Care Instructions. \" Current as of: December 7, 2017 Content Version: 11.7 © 1768-3640 [employer]. Care instructions adapted under license by [employer] (which disclaims liability or warranty for this information). If you have questions about a medical condition or this instruction, always ask your healthcare professional. [place] [address] any warranty or liability for your use of this information. Introducing [facility]! [employer] introduces [employer] patient portal. Now you can access parts of your medical record, email your doctor's office, and request medication refills online. 1. In your internet browser, go to [email or web address] 2. Click on the First Time User? Click Here link in the Sign In box. You will see the New Member Sign Up page. 3. Enter your [employer] Access Code exactly as it appears below. You will not need to use this code after youve completed the sign-up process. If you do not sign up before the expiration date, you must request a new code. · [employer] Access Code: [email or web address] Expires: 10/28/2018 12:26 PM 
 
4. Enter the last four digits of your Social Security Number (xxxx) and Date of Birth (mm/dd/yyyy) as indicated and click Submit. You will be taken to the next sign-up page. 5. Create a [employer] ID. This will be your [employer] login ID and cannot be changed, so think of one that is secure and easy to remember. 6. Create a [employer] password. You can change your password at any time. 7. Enter your Password Reset Question and Answer. This can be used at a later time if you forget your password. 8. Enter your e-mail address. You will receive e-mail notification when new information is available in [address] 9. Click Sign Up. You can now view and download portions of your medical record. 10. Click the Download Summary menu link to download a portable copy of your medical information. If you have questions, please visit the Frequently Asked Questions section of the [employer] website. Remember, [employer] is NOT to be used for urgent needs. For medical emergencies, dial 911. Now available from your iPhone and Android! Please provide this summary of care documentation to your next provider. Your primary care clinician is listed as María Bryan. If you have any questions after today's visit, please call [phone].

## 2018-07-30 NOTE — LETTER
7/30/2018 12:27 PM 
 
Ms. Adelia Mckee [address] [phone] To whom it may concern, Based on my exam on 7/30/2018 I see no neurologic reason that would exclude Adelia Mckee from using an IUD. Sincerely, Adam Green MD

## 2018-07-30 NOTE — PATIENT INSTRUCTIONS
Office Policies        Phone calls/patient messages:    · Please allow up to 24 hours for someone in the office to contact you about your call or message. Be mindful your provider may be out of the office or your message may require further review. We encourage you to use [employer] for your messages as this is a faster, more efficient way to communicate with our office           Medication Refills:    · Prescription medications require up to 48 business hours to process. We encourage you to use [employer] for your refills. · For controlled medications: Please allow up to 72 business hours to process. Certain medications may require you to  a written prescription at our office. · NO narcotic/controlled medications will be prescribed after 4pm Monday through Friday or on weekends              Form/Paperwork Completion:    · Please note there is a $25 fee for all paperwork completed by our providers. We ask that you allow 7-14 business days. Pre-payment is due prior to picking up/faxing the completed form. You may also download your forms to [employer] to have your doctor print off. A Healthy Lifestyle: Care Instructions  Your Care Instructions    A healthy lifestyle can help you feel good, stay at a healthy weight, and have plenty of energy for both work and play. A healthy lifestyle is something you can share with your whole family. A healthy lifestyle also can lower your risk for serious health problems, such as high blood pressure, heart disease, and diabetes. You can follow a few steps listed below to improve your health and the health of your family. Follow-up care is a key part of your treatment and safety. Be sure to make and go to all appointments, and call your doctor if you are having problems. It's also a good idea to know your test results and keep a list of the medicines you take. How can you care for yourself at home? · Do not eat too much sugar, fat, or fast foods.  You can still have dessert and treats now and then. The goal is moderation. · Start small to improve your eating habits. Pay attention to portion sizes, drink less juice and soda pop, and eat more fruits and vegetables. ¨ Eat a healthy amount of food. A 3-ounce serving of meat, for example, is about the size of a deck of cards. Fill the rest of your plate with vegetables and whole grains. ¨ Limit the amount of soda and sports drinks you have every day. Drink more water when you are thirsty. ¨ Eat at least 5 servings of fruits and vegetables every day. It may seem like a lot, but it is not hard to reach this goal. A serving or helping is 1 piece of fruit, 1 cup of vegetables, or 2 cups of leafy, raw vegetables. Have an apple or some carrot sticks as an afternoon snack instead of a candy bar. Try to have fruits and/or vegetables at every meal.  · Make exercise part of your daily routine. You may want to start with simple activities, such as walking, bicycling, or slow swimming. Try to be active 30 to 60 minutes every day. You do not need to do all 30 to 60 minutes all at once. For example, you can exercise 3 times a day for 10 or 20 minutes. Moderate exercise is safe for most people, but it is always a good idea to talk to your doctor before starting an exercise program.  · Keep moving. Angelika Miller the lawn, work in the garden, or [employer]. Take the stairs instead of the elevator at work. · If you smoke, quit. People who smoke have an increased risk for heart attack, stroke, cancer, and other lung illnesses. Quitting is hard, but there are ways to boost your chance of quitting tobacco for good. ¨ Use nicotine gum, patches, or lozenges. ¨ Ask your doctor about stop-smoking programs and medicines. ¨ Keep trying.   In addition to reducing your risk of diseases in the future, you will notice some benefits soon after you stop using tobacco. If you have shortness of breath or asthma symptoms, they will likely get better within a few weeks after you quit. · Limit how much alcohol you drink. Moderate amounts of alcohol (up to 2 drinks a day for men, 1 drink a day for women) are okay. But drinking too much can lead to liver problems, high blood pressure, and other health problems. Family health  If you have a family, there are many things you can do together to improve your health. · Eat meals together as a family as often as possible. · Eat healthy foods. This includes fruits, vegetables, lean meats and dairy, and whole grains. · Include your family in your fitness plan. Most people think of activities such as jogging or tennis as the way to fitness, but there are many ways you and your family can be more active. Anything that makes you breathe hard and gets your heart pumping is exercise. Here are some tips:  ¨ Walk to do errands or to take your child to school or the bus. ¨ Go for a family bike ride after dinner instead of watching TV. Where can you learn more? Go to http://hemant-camacho.info/. Enter M654 in the search box to learn more about \"A Healthy Lifestyle: Care Instructions. \"  Current as of: December 7, 2017  Content Version: 11.7  © 3403-3852 [employer], [employer]. Care instructions adapted under license by [employer] (which disclaims liability or warranty for this information). If you have questions about a medical condition or this instruction, always ask your healthcare professional. [place] [address] any warranty or liability for your use of this information.

## 2018-08-02 ENCOUNTER — TELEPHONE (OUTPATIENT)
Dept: NEUROLOGY | Age: 23
End: 2018-08-02

## 2018-08-02 NOTE — TELEPHONE ENCOUNTER
Spoke with the patient and she stated that she is going to set up an appointment with Dr. Le hampton

## 2018-08-02 NOTE — TELEPHONE ENCOUNTER
Just wanted to let you know that they are trying to contact the patient for a referral that was sent over by Dr. Horacio Arrington and they have had no luck scheduling the patient for the study.

## 2018-08-09 ENCOUNTER — DOCUMENTATION ONLY (OUTPATIENT)
Dept: NEUROLOGY | Age: 23
End: 2018-08-09

## 2018-08-09 ENCOUNTER — TELEPHONE (OUTPATIENT)
Dept: NEUROLOGY | Age: 23
End: 2018-08-09

## 2018-08-09 NOTE — TELEPHONE ENCOUNTER
Batsheva Chavez states that they recvd the sleep referral; however the office is note able to get  In connect with pt

## 2022-03-19 PROBLEM — R01.1 HEART MURMUR: Status: ACTIVE | Noted: 2017-04-10

## 2022-03-19 PROBLEM — G93.2 PSEUDOTUMOR CEREBRI: Status: ACTIVE | Noted: 2017-04-10

## 2022-03-20 PROBLEM — E66.01 MORBID OBESITY (HCC): Status: ACTIVE | Noted: 2017-04-10

## 2022-05-09 ENCOUNTER — OFFICE VISIT (OUTPATIENT)
Dept: INTERNAL MEDICINE CLINIC | Age: 27
End: 2022-05-09
Payer: COMMERCIAL

## 2022-05-09 VITALS
WEIGHT: 234 LBS | DIASTOLIC BLOOD PRESSURE: 63 MMHG | OXYGEN SATURATION: 98 % | SYSTOLIC BLOOD PRESSURE: 117 MMHG | TEMPERATURE: 98 F | BODY MASS INDEX: 38.99 KG/M2 | RESPIRATION RATE: 16 BRPM | HEART RATE: 69 BPM | HEIGHT: 65 IN

## 2022-05-09 DIAGNOSIS — E66.01 MORBID OBESITY (HCC): Primary | ICD-10-CM

## 2022-05-09 DIAGNOSIS — J30.9 ALLERGIC RHINITIS, UNSPECIFIED SEASONALITY, UNSPECIFIED TRIGGER: ICD-10-CM

## 2022-05-09 DIAGNOSIS — L30.9 ECZEMA, UNSPECIFIED TYPE: ICD-10-CM

## 2022-05-09 DIAGNOSIS — Z13.1 SCREENING FOR DIABETES MELLITUS: ICD-10-CM

## 2022-05-09 DIAGNOSIS — Z13.220 SCREENING CHOLESTEROL LEVEL: ICD-10-CM

## 2022-05-09 DIAGNOSIS — E28.2 PCOS (POLYCYSTIC OVARIAN SYNDROME): ICD-10-CM

## 2022-05-09 DIAGNOSIS — H61.21 EXCESSIVE CERUMEN IN RIGHT EAR CANAL: ICD-10-CM

## 2022-05-09 DIAGNOSIS — Z11.3 SCREEN FOR STD (SEXUALLY TRANSMITTED DISEASE): ICD-10-CM

## 2022-05-09 DIAGNOSIS — F41.9 ANXIETY: ICD-10-CM

## 2022-05-09 DIAGNOSIS — F45.8 BRUXISM (TEETH GRINDING): ICD-10-CM

## 2022-05-09 DIAGNOSIS — H65.23 BILATERAL CHRONIC SEROUS OTITIS MEDIA: ICD-10-CM

## 2022-05-09 PROCEDURE — 99203 OFFICE O/P NEW LOW 30 MIN: CPT | Performed by: PHYSICIAN ASSISTANT

## 2022-05-09 RX ORDER — CETIRIZINE HCL 10 MG
10 TABLET ORAL
COMMUNITY

## 2022-05-09 RX ORDER — HYDROCORTISONE 1 %
CREAM (GRAM) TOPICAL 2 TIMES DAILY
Qty: 30 G | Refills: 0 | Status: SHIPPED | OUTPATIENT
Start: 2022-05-09

## 2022-05-09 RX ORDER — FLUTICASONE PROPIONATE 50 MCG
2 SPRAY, SUSPENSION (ML) NASAL DAILY
COMMUNITY

## 2022-05-09 NOTE — PROGRESS NOTES
1. Have you been to the ER, urgent care clinic since your last visit? Hospitalized since your last visit? No    2. Have you seen or consulted any other health care providers outside of the [address] [place] [address] since your last visit? Include any pap smears or colon screening.  No   Chief Complaint   Patient presents with   [address]

## 2022-05-20 LAB
ALBUMIN SERPL-MCNC: 4.5 G/DL (ref 3.9–5)
ALBUMIN/GLOB SERPL: 1.6 {RATIO} (ref 1.2–2.2)
ALP SERPL-CCNC: 57 IU/L (ref 44–121)
ALT SERPL-CCNC: 21 IU/L (ref 0–32)
AST SERPL-CCNC: 15 IU/L (ref 0–40)
BILIRUB SERPL-MCNC: 0.7 MG/DL (ref 0–1.2)
BUN SERPL-MCNC: 7 MG/DL (ref 6–20)
BUN/CREAT SERPL: 8 (ref 9–23)
C TRACH RRNA SPEC QL NAA+PROBE: NEGATIVE
CALCIUM SERPL-MCNC: 9.6 MG/DL (ref 8.7–10.2)
CHLORIDE SERPL-SCNC: 102 MMOL/L (ref 96–106)
CHOLEST SERPL-MCNC: 174 MG/DL (ref 100–199)
CO2 SERPL-SCNC: 22 MMOL/L (ref 20–29)
CREAT SERPL-MCNC: 0.84 MG/DL (ref 0.57–1)
EGFR: 99 ML/MIN/1.73
ERYTHROCYTE [DISTWIDTH] IN BLOOD BY AUTOMATED COUNT: 12.7 % (ref 11.7–15.4)
EST. AVERAGE GLUCOSE BLD GHB EST-MCNC: 100 MG/DL
ESTRADIOL SERPL-MCNC: 16.4 PG/ML
GLOBULIN SER CALC-MCNC: 2.9 G/DL (ref 1.5–4.5)
GLUCOSE SERPL-MCNC: 107 MG/DL (ref 65–99)
HBA1C MFR BLD: 5.1 % (ref 4.8–5.6)
HBV SURFACE AG SERPL QL IA: NEGATIVE
HCT VFR BLD AUTO: 41.7 % (ref 34–46.6)
HCV AB S/CO SERPL IA: <0.1 S/CO RATIO (ref 0–0.9)
HDLC SERPL-MCNC: 40 MG/DL
HGB BLD-MCNC: 13.4 G/DL (ref 11.1–15.9)
HIV 1+2 AB+HIV1 P24 AG SERPL QL IA: NON REACTIVE
IMP & REVIEW OF LAB RESULTS: NORMAL
LDLC SERPL CALC-MCNC: 108 MG/DL (ref 0–99)
MCH RBC QN AUTO: 27.3 PG (ref 26.6–33)
MCHC RBC AUTO-ENTMCNC: 32.1 G/DL (ref 31.5–35.7)
MCV RBC AUTO: 85 FL (ref 79–97)
N GONORRHOEA RRNA SPEC QL NAA+PROBE: NEGATIVE
PLATELET # BLD AUTO: 351 X10E3/UL (ref 150–450)
POTASSIUM SERPL-SCNC: 4.5 MMOL/L (ref 3.5–5.2)
PROGEST SERPL-MCNC: 0.6 NG/ML
PROT SERPL-MCNC: 7.4 G/DL (ref 6–8.5)
RBC # BLD AUTO: 4.91 X10E6/UL (ref 3.77–5.28)
SODIUM SERPL-SCNC: 140 MMOL/L (ref 134–144)
T VAGINALIS RRNA SPEC QL NAA+PROBE: NEGATIVE
T4 SERPL-MCNC: 7.9 UG/DL (ref 4.5–12)
TESTOST SERPL-MCNC: 28.5 NG/DL (ref 10–55)
TREPONEMA PALLIDUM IGG+IGM AB [PRESENCE] IN SERUM OR PLASMA BY IMMUNOASSAY: NON REACTIVE
TRIGL SERPL-MCNC: 148 MG/DL (ref 0–149)
TSH SERPL DL<=0.005 MIU/L-ACNC: 1.77 UIU/ML (ref 0.45–4.5)
VLDLC SERPL CALC-MCNC: 26 MG/DL (ref 5–40)
WBC # BLD AUTO: 6.7 X10E3/UL (ref 3.4–10.8)

## 2023-04-24 ENCOUNTER — OFFICE VISIT (OUTPATIENT)
Dept: INTERNAL MEDICINE CLINIC | Age: 28
End: 2023-04-24
Payer: COMMERCIAL

## 2023-04-24 VITALS
OXYGEN SATURATION: 99 % | DIASTOLIC BLOOD PRESSURE: 78 MMHG | WEIGHT: 256.8 LBS | TEMPERATURE: 98.1 F | HEART RATE: 96 BPM | HEIGHT: 65 IN | RESPIRATION RATE: 14 BRPM | BODY MASS INDEX: 42.78 KG/M2 | SYSTOLIC BLOOD PRESSURE: 125 MMHG

## 2023-04-24 DIAGNOSIS — L71.0 PERIORAL DERMATITIS: ICD-10-CM

## 2023-04-24 DIAGNOSIS — H60.543 ECZEMA OF BOTH EXTERNAL EARS: Primary | ICD-10-CM

## 2023-04-24 PROCEDURE — 99214 OFFICE O/P EST MOD 30 MIN: CPT | Performed by: PHYSICIAN ASSISTANT

## 2023-04-24 RX ORDER — TRIAMCINOLONE ACETONIDE 1 MG/G
CREAM TOPICAL
Qty: 45 G | Refills: 1 | Status: SHIPPED | OUTPATIENT
Start: 2023-04-24

## 2023-04-24 RX ORDER — METRONIDAZOLE 10 MG/G
GEL TOPICAL DAILY
Qty: 45 G | Refills: 2 | Status: SHIPPED | OUTPATIENT
Start: 2023-04-24

## 2023-04-24 RX ORDER — ETONOGESTREL AND ETHINYL ESTRADIOL 11.7; 2.7 MG/1; MG/1
INSERT, EXTENDED RELEASE VAGINAL
COMMUNITY

## 2023-04-24 NOTE — PROGRESS NOTES
Stephanie Ragland is a 34 y.o. female  Chief Complaint   Patient presents with    Rash     Started 01/2023 - round mouth on her chin, discoloration, itchy, flaky     Ear Pain     Ears are still itchy      Visit Vitals  /78 (BP 1 Location: Left upper arm, BP Patient Position: Sitting, BP Cuff Size: Adult)   Pulse 96   Temp 98.1 °F (36.7 °C) (Temporal)   Resp 14   Ht 5' 5\" (1.651 m)   Wt 256 lb 12.8 oz (116.5 kg)   SpO2 99%   BMI 42.73 kg/m²      Health Maintenance Due   Topic Date Due    COVID-19 Vaccine (1) Never done    Varicella Vaccine (1 of 2 - 2-dose childhood series) Never done    HPV Age 9Y-34Y (2 - 3-dose series) 08/30/2016    DTaP/Tdap/Td series (1 - Tdap) Never done    Pap Smear  Never done     HPI  External ears are still itching - went to ENT a few times without improvement. Taking Zyrtec-D regularly. Was unable to tolerate two different steroid nasal sprays due to headache - even with proper nasal spray technique. Skin around lips/chin dry, scaling x months. No known exposures to allergens. Using sensitive face wash & moisturizer. ROS  Review of Systems   Respiratory:  Negative for shortness of breath. Cardiovascular:  Negative for chest pain and palpitations. Neurological:  Negative for dizziness, loss of consciousness and weakness. EXAM  Physical Exam  Vitals and nursing note reviewed. Constitutional:       General: She is not in acute distress. Appearance: She is well-developed. HENT:      Head: Normocephalic and atraumatic. Ears:      Comments: B TMs with fluid. No erythema B TMs. External ears B scaling and dry. Nose: Congestion and rhinorrhea present. Comments: Pale, inflamed nasal mucosa  Eyes:      Conjunctiva/sclera: Conjunctivae normal.   Cardiovascular:      Rate and Rhythm: Normal rate and regular rhythm. Heart sounds: Normal heart sounds. Pulmonary:      Effort: Pulmonary effort is normal.      Breath sounds: Normal breath sounds. Musculoskeletal:      Cervical back: Neck supple. Lymphadenopathy:      Cervical: No cervical adenopathy. Skin:     General: Skin is warm and dry. Comments: Skin around lips and chin dry, red, scaling. Neurological:      Mental Status: She is alert and oriented to person, place, and time. ASSESSMENT/PLAN  Encounter Diagnoses     ICD-10-CM ICD-9-CM   1. Eczema of both external ears  H60.543 380.22   2. Perioral dermatitis  L71.0 695.3     Orders Placed This Encounter    REFERRAL TO DERMATOLOGY - ok to schedule out several months because this tx will likely work. metroNIDAZOLE (METROGEL) 1 % topical gel    triamcinolone acetonide (KENALOG) 0.1 % topical cream     If Perioral dermatitis no better in 1 month, instructed pt to send me message for oral antibiotics and then follow up with Dermatology.

## 2023-05-18 RX ORDER — TRIAMCINOLONE ACETONIDE 1 MG/G
CREAM TOPICAL 2 TIMES DAILY PRN
COMMUNITY
Start: 2023-04-24

## 2023-05-18 RX ORDER — METRONIDAZOLE 10 MG/G
GEL TOPICAL DAILY
COMMUNITY
Start: 2023-04-24

## 2023-05-18 RX ORDER — ETONOGESTREL AND ETHINYL ESTRADIOL 11.7; 2.7 MG/1; MG/1
INSERT, EXTENDED RELEASE VAGINAL
COMMUNITY

## 2023-05-18 RX ORDER — CETIRIZINE HYDROCHLORIDE 10 MG/1
10 TABLET ORAL
COMMUNITY
